# Patient Record
Sex: MALE | Employment: UNEMPLOYED | URBAN - NONMETROPOLITAN AREA
[De-identification: names, ages, dates, MRNs, and addresses within clinical notes are randomized per-mention and may not be internally consistent; named-entity substitution may affect disease eponyms.]

---

## 2022-01-22 ENCOUNTER — APPOINTMENT (EMERGENCY)
Dept: RADIOLOGY | Facility: HOSPITAL | Age: 22
End: 2022-01-22

## 2022-01-22 ENCOUNTER — APPOINTMENT (EMERGENCY)
Dept: CT IMAGING | Facility: HOSPITAL | Age: 22
End: 2022-01-22

## 2022-01-22 ENCOUNTER — HOSPITAL ENCOUNTER (EMERGENCY)
Facility: HOSPITAL | Age: 22
Discharge: HOME | End: 2022-01-22
Attending: EMERGENCY MEDICINE | Admitting: EMERGENCY MEDICINE

## 2022-01-22 VITALS
OXYGEN SATURATION: 90 % | TEMPERATURE: 98.2 F | HEART RATE: 64 BPM | DIASTOLIC BLOOD PRESSURE: 56 MMHG | SYSTOLIC BLOOD PRESSURE: 119 MMHG | WEIGHT: 184.2 LBS | RESPIRATION RATE: 18 BRPM

## 2022-01-22 DIAGNOSIS — J18.9 PNEUMONIA: Primary | ICD-10-CM

## 2022-01-22 DIAGNOSIS — F19.90 DRUG USE: ICD-10-CM

## 2022-01-22 DIAGNOSIS — R11.0 NAUSEA: ICD-10-CM

## 2022-01-22 DIAGNOSIS — R25.1 SHAKING: ICD-10-CM

## 2022-01-22 LAB
ALBUMIN SERPL BCP-MCNC: 3.7 G/DL (ref 3.5–5)
ALP SERPL-CCNC: 72 U/L (ref 46–116)
ALT SERPL W P-5'-P-CCNC: 35 U/L (ref 12–78)
AMPHETAMINES SERPL QL SCN: NEGATIVE
ANION GAP SERPL CALCULATED.3IONS-SCNC: 10 MMOL/L (ref 4–13)
APAP SERPL-MCNC: <2 UG/ML (ref 10–20)
AST SERPL W P-5'-P-CCNC: 17 U/L (ref 5–45)
BACTERIA UR QL AUTO: ABNORMAL /HPF
BARBITURATES UR QL: NEGATIVE
BASOPHILS # BLD AUTO: 0.05 THOUSANDS/ΜL (ref 0–0.1)
BASOPHILS NFR BLD AUTO: 0 % (ref 0–1)
BENZODIAZ UR QL: NEGATIVE
BILIRUB SERPL-MCNC: 0.32 MG/DL (ref 0.2–1)
BILIRUB UR QL STRIP: NEGATIVE
BUN SERPL-MCNC: 13 MG/DL (ref 5–25)
CALCIUM SERPL-MCNC: 8.5 MG/DL (ref 8.3–10.1)
CHLORIDE SERPL-SCNC: 101 MMOL/L (ref 100–108)
CK SERPL-CCNC: 106 U/L (ref 39–308)
CLARITY UR: CLEAR
CO2 SERPL-SCNC: 27 MMOL/L (ref 21–32)
COCAINE UR QL: POSITIVE
COLOR UR: YELLOW
CREAT SERPL-MCNC: 0.9 MG/DL (ref 0.6–1.3)
EOSINOPHIL # BLD AUTO: 0.05 THOUSAND/ΜL (ref 0–0.61)
EOSINOPHIL NFR BLD AUTO: 0 % (ref 0–6)
ERYTHROCYTE [DISTWIDTH] IN BLOOD BY AUTOMATED COUNT: 12.3 % (ref 11.6–15.1)
ETHANOL SERPL-MCNC: <3 MG/DL (ref 0–3)
FLUAV RNA RESP QL NAA+PROBE: NEGATIVE
FLUBV RNA RESP QL NAA+PROBE: NEGATIVE
GFR SERPL CREATININE-BSD FRML MDRD: 120 ML/MIN/1.73SQ M
GLUCOSE SERPL-MCNC: 108 MG/DL (ref 65–140)
GLUCOSE SERPL-MCNC: 109 MG/DL (ref 65–140)
GLUCOSE UR STRIP-MCNC: ABNORMAL MG/DL
HCT VFR BLD AUTO: 41.4 % (ref 36.5–49.3)
HGB BLD-MCNC: 13.5 G/DL (ref 12–17)
HGB UR QL STRIP.AUTO: NEGATIVE
IMM GRANULOCYTES # BLD AUTO: 0.07 THOUSAND/UL (ref 0–0.2)
IMM GRANULOCYTES NFR BLD AUTO: 1 % (ref 0–2)
KETONES UR STRIP-MCNC: ABNORMAL MG/DL
LACTATE SERPL-SCNC: 1.1 MMOL/L (ref 0.5–2)
LEUKOCYTE ESTERASE UR QL STRIP: NEGATIVE
LYMPHOCYTES # BLD AUTO: 1.39 THOUSANDS/ΜL (ref 0.6–4.47)
LYMPHOCYTES NFR BLD AUTO: 12 % (ref 14–44)
MAGNESIUM SERPL-MCNC: 1.9 MG/DL (ref 1.6–2.6)
MCH RBC QN AUTO: 28.2 PG (ref 26.8–34.3)
MCHC RBC AUTO-ENTMCNC: 32.6 G/DL (ref 31.4–37.4)
MCV RBC AUTO: 87 FL (ref 82–98)
METHADONE UR QL: NEGATIVE
MONOCYTES # BLD AUTO: 0.88 THOUSAND/ΜL (ref 0.17–1.22)
MONOCYTES NFR BLD AUTO: 8 % (ref 4–12)
MUCOUS THREADS UR QL AUTO: ABNORMAL
NEUTROPHILS # BLD AUTO: 8.98 THOUSANDS/ΜL (ref 1.85–7.62)
NEUTS SEG NFR BLD AUTO: 79 % (ref 43–75)
NITRITE UR QL STRIP: NEGATIVE
NON-SQ EPI CELLS URNS QL MICRO: ABNORMAL /HPF
NRBC BLD AUTO-RTO: 0 /100 WBCS
OPIATES UR QL SCN: NEGATIVE
OXYCODONE+OXYMORPHONE UR QL SCN: NEGATIVE
PCP UR QL: NEGATIVE
PH UR STRIP.AUTO: 5.5 [PH]
PLATELET # BLD AUTO: 269 THOUSANDS/UL (ref 149–390)
PMV BLD AUTO: 10.2 FL (ref 8.9–12.7)
POTASSIUM SERPL-SCNC: 3.8 MMOL/L (ref 3.5–5.3)
PROT SERPL-MCNC: 7.5 G/DL (ref 6.4–8.2)
PROT UR STRIP-MCNC: ABNORMAL MG/DL
RBC # BLD AUTO: 4.78 MILLION/UL (ref 3.88–5.62)
RBC #/AREA URNS AUTO: ABNORMAL /HPF
RSV RNA RESP QL NAA+PROBE: NEGATIVE
SARS-COV-2 RNA RESP QL NAA+PROBE: NEGATIVE
SODIUM SERPL-SCNC: 138 MMOL/L (ref 136–145)
SP GR UR STRIP.AUTO: >=1.03 (ref 1–1.03)
THC UR QL: POSITIVE
TSH SERPL DL<=0.05 MIU/L-ACNC: 0.22 UIU/ML (ref 0.36–3.74)
UROBILINOGEN UR QL STRIP.AUTO: 0.2 E.U./DL
WBC # BLD AUTO: 11.42 THOUSAND/UL (ref 4.31–10.16)
WBC #/AREA URNS AUTO: ABNORMAL /HPF

## 2022-01-22 PROCEDURE — 83735 ASSAY OF MAGNESIUM: CPT | Performed by: PHYSICIAN ASSISTANT

## 2022-01-22 PROCEDURE — 0241U HB NFCT DS VIR RESP RNA 4 TRGT: CPT | Performed by: PHYSICIAN ASSISTANT

## 2022-01-22 PROCEDURE — 96361 HYDRATE IV INFUSION ADD-ON: CPT

## 2022-01-22 PROCEDURE — 84443 ASSAY THYROID STIM HORMONE: CPT | Performed by: PHYSICIAN ASSISTANT

## 2022-01-22 PROCEDURE — 82948 REAGENT STRIP/BLOOD GLUCOSE: CPT

## 2022-01-22 PROCEDURE — 71045 X-RAY EXAM CHEST 1 VIEW: CPT

## 2022-01-22 PROCEDURE — 81001 URINALYSIS AUTO W/SCOPE: CPT | Performed by: PHYSICIAN ASSISTANT

## 2022-01-22 PROCEDURE — 99285 EMERGENCY DEPT VISIT HI MDM: CPT | Performed by: PHYSICIAN ASSISTANT

## 2022-01-22 PROCEDURE — 96374 THER/PROPH/DIAG INJ IV PUSH: CPT

## 2022-01-22 PROCEDURE — 82077 ASSAY SPEC XCP UR&BREATH IA: CPT | Performed by: PHYSICIAN ASSISTANT

## 2022-01-22 PROCEDURE — 85025 COMPLETE CBC W/AUTO DIFF WBC: CPT | Performed by: PHYSICIAN ASSISTANT

## 2022-01-22 PROCEDURE — 36415 COLL VENOUS BLD VENIPUNCTURE: CPT | Performed by: PHYSICIAN ASSISTANT

## 2022-01-22 PROCEDURE — 80053 COMPREHEN METABOLIC PANEL: CPT | Performed by: PHYSICIAN ASSISTANT

## 2022-01-22 PROCEDURE — 70450 CT HEAD/BRAIN W/O DYE: CPT

## 2022-01-22 PROCEDURE — 93005 ELECTROCARDIOGRAM TRACING: CPT

## 2022-01-22 PROCEDURE — 99285 EMERGENCY DEPT VISIT HI MDM: CPT

## 2022-01-22 PROCEDURE — 83605 ASSAY OF LACTIC ACID: CPT | Performed by: PHYSICIAN ASSISTANT

## 2022-01-22 PROCEDURE — 82550 ASSAY OF CK (CPK): CPT | Performed by: PHYSICIAN ASSISTANT

## 2022-01-22 PROCEDURE — 80307 DRUG TEST PRSMV CHEM ANLYZR: CPT | Performed by: PHYSICIAN ASSISTANT

## 2022-01-22 PROCEDURE — 80143 DRUG ASSAY ACETAMINOPHEN: CPT | Performed by: PHYSICIAN ASSISTANT

## 2022-01-22 PROCEDURE — 96375 TX/PRO/DX INJ NEW DRUG ADDON: CPT

## 2022-01-22 PROCEDURE — G1004 CDSM NDSC: HCPCS

## 2022-01-22 RX ORDER — AZITHROMYCIN 250 MG/1
TABLET, FILM COATED ORAL
Qty: 6 TABLET | Refills: 0 | Status: SHIPPED | OUTPATIENT
Start: 2022-01-22 | End: 2022-01-26

## 2022-01-22 RX ORDER — ONDANSETRON 4 MG/1
4 TABLET, ORALLY DISINTEGRATING ORAL EVERY 6 HOURS PRN
Qty: 20 TABLET | Refills: 0 | Status: SHIPPED | OUTPATIENT
Start: 2022-01-22

## 2022-01-22 RX ORDER — NALOXONE HYDROCHLORIDE 0.4 MG/ML
0.2 INJECTION, SOLUTION INTRAMUSCULAR; INTRAVENOUS; SUBCUTANEOUS ONCE
Status: COMPLETED | OUTPATIENT
Start: 2022-01-22 | End: 2022-01-22

## 2022-01-22 RX ORDER — ONDANSETRON 2 MG/ML
4 INJECTION INTRAMUSCULAR; INTRAVENOUS ONCE
Status: COMPLETED | OUTPATIENT
Start: 2022-01-22 | End: 2022-01-22

## 2022-01-22 RX ADMIN — NALOXONE HYDROCHLORIDE 0.2 MG: 0.4 INJECTION, SOLUTION INTRAMUSCULAR; INTRAVENOUS; SUBCUTANEOUS at 13:17

## 2022-01-22 RX ADMIN — SODIUM CHLORIDE 1000 ML: 0.9 INJECTION, SOLUTION INTRAVENOUS at 12:37

## 2022-01-22 RX ADMIN — ONDANSETRON 4 MG: 2 INJECTION INTRAMUSCULAR; INTRAVENOUS at 12:46

## 2022-01-22 NOTE — ED NOTES
Patient reports taking a pill from a friend that was supposedly a percocet, but is unsure of what it was because it was not prescribed and he took it for recreational use as a trial       Rod Kendrick RN  01/22/22 37[address] [facility]

## 2022-01-22 NOTE — ED PROVIDER NOTES
History  Chief Complaint   Patient presents with    Nausea     patient Aunt stated the ambulance was called to his house 3 times in past 2 days and they gave him narcan once because he was not breathing (according to aunt)  and he woke up  patient nauseated currently  smokes marijuana   Seizure - New Onset     per aunt lasting 2-3 minutes  happened last night and tuesday night  Patient is a 24year old male past medical history of drug abuse, who presents to the ED escorted by his aunt for multiple concerns  Aunt states that he has been staying with her  On 1/18, he was taken to the ED at [facility] via EMS for drug overdose  He admits to smoking marijuana daily, but states the day of his overdose he took "half a pill"  He was told it was a percoet but is not sure  He recovered with narcan in EMS and stayed in the hospital overnight  Once release he was improved but over the last few days he has been "out of it" and having " shaking episodes" where his whole body shakes and he is unresponsive  He awakes immediately and alert and oriented, but doesn't remember the events  No head injury, that her bladder incontinence, tongue biting  This episode has occurred 3 times most recently last night  Today he is very tired and feeling out of It  Did admit to some nausea without vomiting today  The patient states that he has been smoking marijuana daily  He states that the last ER suggested that he may be having marijuana laced with fentanyl     Per patient he denies any SI or HI  States that this overdose was unintentional   States that he does smoke daily marijuana  Has continued to smoke marijuana since the overdose  He denies any IV drug abuse or snoring medications  Denies any alcohol abuse  Denies any current pain  Patient currently denies any SI or HI  Denies any cough shortness of breath, abdominal pain diarrhea back pain headache            History provided by:  Patient and relative  Nausea  The primary symptoms include fatigue and nausea  Primary symptoms do not include fever, weight loss, abdominal pain, vomiting, melena, hematemesis, dysuria, myalgias, arthralgias or rash  The illness began today  The onset was gradual  The problem has not changed since onset  The fatigue began 3 to 5 days ago  The fatigue has been unchanged since its onset  The illness is also significant for chills  The illness does not include dysphagia, bloating, back pain or itching  Associated medical issues do not include alcohol abuse  Overdose - Accidental  Ingested substance:  Illicit drugs  Illicit drug type:  Marijuana  Witnesses present: no    Called poison control: no    Incident location:  Home  Context: recreational    Context: not suicide attempt    Associated symptoms: nausea and unresponsiveness    Associated symptoms: no abdominal pain, no agitation, no altered mental status, no chest pain, no headaches, no shortness of breath and no vomiting    Nausea:     Severity:  Moderate    Onset quality:  Gradual    Timing:  Intermittent    Progression:  Waxing and waning  Risk factors: no hx of sucide attempts and no similar prior episodes        None       History reviewed  No pertinent past medical history  Past Surgical History:   Procedure Laterality Date    WISDOM TOOTH EXTRACTION         History reviewed  No pertinent family history  I have reviewed and agree with the history as documented  E-Cigarette/Vaping    E-Cigarette Use Current Every Day User      E-Cigarette/Vaping Substances    Nicotine Yes      Social History     Tobacco Use    Smoking status: Current Every Day Smoker     Packs/day: 0 50    Smokeless tobacco: Never Used   Vaping Use    Vaping Use: Every day    Substances: Nicotine   Substance Use Topics    Alcohol use: Never    Drug use: Yes     Types: Marijuana       Review of Systems   Constitutional: Positive for chills and fatigue  Negative for fever and weight loss     HENT: Negative for ear pain     Eyes: Negative for pain and visual disturbance  Respiratory: Negative for shortness of breath and stridor  Cardiovascular: Negative for chest pain and palpitations  Gastrointestinal: Positive for nausea  Negative for abdominal pain, bloating, dysphagia, hematemesis, melena and vomiting  Genitourinary: Negative for dysuria and hematuria  Musculoskeletal: Negative for arthralgias, back pain and myalgias  Skin: Negative for color change, itching and rash  Neurological: Positive for tremors and seizures  Negative for speech difficulty and headaches  Psychiatric/Behavioral: Negative for agitation  All other systems reviewed and are negative  Physical Exam  Physical Exam  Vitals and nursing note reviewed  Constitutional:       Appearance: Normal appearance  He is well-developed  Comments: Somnolent but arousable  HENT:      Head: Normocephalic and atraumatic  Nose: Nose normal       Mouth/Throat:      Mouth: Mucous membranes are dry  Eyes:      Extraocular Movements: Extraocular movements intact  Conjunctiva/sclera: Conjunctivae normal       Pupils: Pupils are equal, round, and reactive to light  Cardiovascular:      Rate and Rhythm: Normal rate and regular rhythm  Heart sounds: No murmur heard  Pulmonary:      Effort: Pulmonary effort is normal  No respiratory distress  Breath sounds: Normal breath sounds  Abdominal:      Palpations: Abdomen is soft  Tenderness: There is no abdominal tenderness  Musculoskeletal:      Cervical back: Neck supple  Skin:     General: Skin is warm and dry  Capillary Refill: Capillary refill takes less than 2 seconds  Neurological:      General: No focal deficit present  Mental Status: He is oriented to person, place, and time and easily aroused  GCS: GCS eye subscore is 4  GCS verbal subscore is 5  GCS motor subscore is 6  Gait: Gait is intact     Psychiatric:         Behavior: Behavior is cooperative           Vital Signs  ED Triage Vitals [01/22/22 1208]   Temperature Pulse Respirations Blood Pressure SpO2   98 2 °F (36 8 °C) 79 18 124/78 98 %      Temp Source Heart Rate Source Patient Position - Orthostatic VS BP Location FiO2 (%)   Temporal Monitor -- -- --      Pain Score       No Pain           Vitals:    01/22/22 1330 01/22/22 1415 01/22/22 1430 01/22/22 1500   BP: 124/67 114/59 119/58 119/56   Pulse: 61 60 57 64         Visual Acuity  Visual Acuity      Most Recent Value   L Pupil Size (mm) 4   R Pupil Size (mm) 4          ED Medications  Medications   sodium chloride 0 9 % bolus 1,000 mL (0 mL Intravenous Stopped 1/22/22 1413)   ondansetron (ZOFRAN) injection 4 mg (4 mg Intravenous Given 1/22/22 1246)   naloxone (NARCAN) injection 0 2 mg (0 2 mg Intravenous Given 1/22/22 1317)       Diagnostic Studies  Results Reviewed     Procedure Component Value Units Date/Time    Urine Microscopic [[id number]]  (Abnormal) Collected: 01/22/22 1414    Lab Status: Final result Specimen: Urine, Clean Catch Updated: 01/22/22 1447     RBC, UA None Seen /hpf      WBC, UA None Seen /hpf      Epithelial Cells Occasional /hpf      Bacteria, UA None Seen /hpf      MUCUS THREADS Moderate    UA w Reflex to Microscopic w Reflex to Culture [[id number]]  (Abnormal) Collected: 01/22/22 1414    Lab Status: Final result Specimen: Urine, Clean Catch Updated: 01/22/22 1441     Color, UA Yellow     Clarity, UA Clear     Specific Gravity, UA >=1 030     pH, UA 5 5     Leukocytes, UA Negative     Nitrite, UA Negative     Protein, UA 30 (1+) mg/dl      Glucose,  (1/10%) mg/dl      Ketones, UA Trace mg/dl      Urobilinogen, UA 0 2 E U /dl      Bilirubin, UA Negative     Blood, UA Negative    Rapid drug screen, urine [[id number]]  (Abnormal) Collected: 01/22/22 1414    Lab Status: Final result Specimen: Urine, Clean Catch Updated: 01/22/22 1437     Amph/Meth UR Negative     Barbiturate Ur Negative     Benzodiazepine Urine Negative     Cocaine Urine Positive     Methadone Urine Negative     Opiate Urine Negative     PCP Ur Negative     THC Urine Positive     Oxycodone Urine Negative    Narrative:      Presumptive report  If requested, specimen will be sent to reference lab for confirmation  FOR MEDICAL PURPOSES ONLY  IF CONFIRMATION NEEDED PLEASE CONTACT THE LAB WITHIN 5 DAYS  Drug Screen Cutoff Levels:  AMPHETAMINE/METHAMPHETAMINES  1000 ng/mL  BARBITURATES     200 ng/mL  BENZODIAZEPINES     200 ng/mL  COCAINE      300 ng/mL  METHADONE      300 ng/mL  OPIATES      300 ng/mL  PHENCYCLIDINE     25 ng/mL  THC       50 ng/mL  OXYCODONE      100 ng/mL    COVID/FLU/RSV - 2 hour TAT [[id number]]  (Normal) Collected: 01/22/22 1234    Lab Status: Final result Specimen: Nares from Nose Updated: 01/22/22 1328     SARS-CoV-2 Negative     INFLUENZA A PCR Negative     INFLUENZA B PCR Negative     RSV PCR Negative    Narrative:      FOR PEDIATRIC PATIENTS - copy/paste COVID Guidelines URL to browser: [email or web address]  [email or web address]    SARS-CoV-2 assay is a Nucleic Acid Amplification assay intended for the  qualitative detection of nucleic acid from SARS-CoV-2 in nasopharyngeal  swabs  Results are for the presumptive identification of SARS-CoV-2 RNA  Positive results are indicative of infection with SARS-CoV-2, the virus  causing COVID-19, but do not rule out bacterial infection or co-infection  with other viruses  Laboratories within the [place] and its  territories are required to report all positive results to the appropriate  public health authorities  Negative results do not preclude SARS-CoV-2  infection and should not be used as the sole basis for treatment or other  patient management decisions  Negative results must be combined with  clinical observations, patient history, and epidemiological information  This test has not been FDA cleared or approved      This test has been authorized by FDA under an Emergency Use Authorization  (EUA)  This test is only authorized for the duration of time the  declaration that circumstances exist justifying the authorization of the  emergency use of an in vitro diagnostic tests for detection of SARS-CoV-2  virus and/or diagnosis of COVID-19 infection under section 564(b)(1) of  the Act, 21 U  S C  548OOU-5(F)(8), unless the authorization is terminated  or revoked sooner  The test has been validated but independent review by FDA  and CLIA is pending  Test performed using [employer] GeneXpert: This RT-PCR assay targets N2,  a region unique to SARS-CoV-2  A conserved region in the E-gene was chosen  for pan-Sarbecovirus detection which includes SARS-CoV-2      Comprehensive metabolic panel [[id number]] Collected: 01/22/22 1232    Lab Status: Final result Specimen: Blood from Arm, Left Updated: 01/22/22 1312     Sodium 138 mmol/L      Potassium 3 8 mmol/L      Chloride 101 mmol/L      CO2 27 mmol/L      ANION GAP 10 mmol/L      BUN 13 mg/dL      Creatinine 0 90 mg/dL      Glucose 109 mg/dL      Calcium 8 5 mg/dL      AST 17 U/L      ALT 35 U/L      Alkaline Phosphatase 72 U/L      Total Protein 7 5 g/dL      Albumin 3 7 g/dL      Total Bilirubin 0 32 mg/dL      eGFR 120 ml/min/1 73sq m     Narrative:      [place] guidelines for Chronic Kidney Disease (CKD):     Stage 1 with normal or high GFR (GFR > 90 mL/min/1 73 square meters)    Stage 2 Mild CKD (GFR = 60-89 mL/min/1 73 square meters)    Stage 3A Moderate CKD (GFR = 45-59 mL/min/1 73 square meters)    Stage 3B Moderate CKD (GFR = 30-44 mL/min/1 73 square meters)    Stage 4 Severe CKD (GFR = 15-29 mL/min/1 73 square meters)    Stage 5 End Stage CKD (GFR <15 mL/min/1 73 square meters)  Note: GFR calculation is accurate only with a steady state creatinine    Magnesium [[id number]]  (Normal) Collected: 01/22/22 1233    Lab Status: Final result Specimen: Blood from Arm, Left Updated: 01/22/22 1312     Magnesium 1 9 mg/dL     Acetaminophen level-"If concentration is detectable, please discuss with medical  on call " [[id number]]  (Abnormal) Collected: 01/22/22 1234    Lab Status: Final result Specimen: Blood from Arm, Left Updated: 01/22/22 1312     Acetaminophen Level <2 0 ug/mL     TSH [[id number]]  (Abnormal) Collected: 01/22/22 1234    Lab Status: Final result Specimen: Blood from Arm, Left Updated: 01/22/22 1311     TSH 3RD GENERATON 0 223 uIU/mL     Narrative:      Patients undergoing fluorescein dye angiography may retain small amounts of fluorescein in the body for 48-72 hours post procedure  Samples containing fluorescein can produce falsely depressed TSH values  If the patient had this procedure,a specimen should be resubmitted post fluorescein clearance  Lactic acid [[id number]]  (Normal) Collected: 01/22/22 1234    Lab Status: Final result Specimen: Blood from Arm, Left Updated: 01/22/22 1307     LACTIC ACID 1 1 mmol/L     Narrative:      Result may be elevated if tourniquet was used during collection      CK Total with Reflex CKMB [[id number]]  (Normal) Collected: 01/22/22 1234    Lab Status: Final result Specimen: Blood from Arm, Left Updated: 01/22/22 1304     Total  U/L     Ethanol [[id number]]  (Normal) Collected: 01/22/22 1234    Lab Status: Final result Specimen: Blood from Arm, Left Updated: 01/22/22 1259     Ethanol Lvl <3 mg/dL     CBC and differential [[id number]]  (Abnormal) Collected: 01/22/22 1234    Lab Status: Final result Specimen: Blood from Arm, Left Updated: 01/22/22 1245     WBC 11 42 Thousand/uL      RBC 4 78 Million/uL      Hemoglobin 13 5 g/dL      Hematocrit 41 4 %      MCV 87 fL      MCH 28 2 pg      MCHC 32 6 g/dL      RDW 12 3 %      MPV 10 2 fL      Platelets 550 Thousands/uL      nRBC 0 /100 WBCs      Neutrophils Relative 79 %      Immat GRANS % 1 %      Lymphocytes Relative 12 %      Monocytes Relative 8 %      Eosinophils Relative 0 %      Basophils Relative 0 %      Neutrophils Absolute 8 98 Thousands/µL      Immature Grans Absolute 0 07 Thousand/uL      Lymphocytes Absolute 1 39 Thousands/µL      Monocytes Absolute 0 88 Thousand/µL      Eosinophils Absolute 0 05 Thousand/µL      Basophils Absolute 0 05 Thousands/µL     Fingerstick Glucose (POCT) [[id number]]  (Normal) Collected: 01/22/22 1232    Lab Status: Final result Updated: 01/22/22 1240     POC Glucose 108 mg/dl                  CT head without contrast   Final Result by Willy Whitt DO (01/22 1322)      No acute intracranial abnormality  If clinically warranted, nonemergent follow-up MRI may be considered to assess potential seizure etiology  Evidence of chronic maxillary sinusitis  Workstation performed: [id number]         XR chest 1 view portable   Final Result by Lizbeth Garg MD (01/22 4808)      Subtle right basilar infiltrate  This could represent early pneumonia  The study was marked in [facility] for immediate notification  Workstation performed: [id number]                    Procedures  ECG 12 Lead Documentation Only    Date/Time: 1/22/2022 1:39 PM  Performed by: Gian Marsh PA-C  Authorized by: Gian Marsh PA-C     Indications / Diagnosis:  Nausea, drug use   ECG reviewed by me, the ED Provider: yes    Patient location:  ED  Previous ECG:     Previous ECG:  Unavailable  Interpretation:     Interpretation: non-specific    Rate:     ECG rate:  78    ECG rate assessment: normal    Rhythm:     Rhythm: sinus rhythm               ED Course  ED Course as of 01/25/22 1957   Sat Jan 22, 2022   1454 COCAINE URINE(!): Positive                               SBIRT 20yo+      Most Recent Value   SBIRT (23 yo +)    In order to provide better care to our patients, we are screening all of our patients for alcohol and drug use  Would it be okay to ask you these screening questions?  No Filed at: 01/22/2022 26                    MDM  Number of Diagnoses or Management Options  Drug use  Nausea  Pneumonia  Shaking  Diagnosis management comments: Patient's lab work demonstrated mild leukocytosis  He noted on chest x-ray to have possible early pneumonia  CT head was unremarkable  Patient's tox screen revealed positive for cocaine and THC  Possibility of fentanyl  ingestion due to lack of testing, unable to confirm  Patient's mental status did improve with the Narcan that was given in the ER  Patient also was rehydrated and given Zofran  Patient was offered warm handoff  Patient declined this at this time  Ambulated and remained 91-94% on room air  But was treated for his pneumonia with a Z-Mark  Noted that these "shaking spells" that was described by the aunt, most suspicious for the drug use  The patient had 3 episodes at the family member described as full body shaking and unresponsiveness  Patient then awoke alert and oriented which is not consistent with any postictal symptoms  Patient also admitted that he has been smoking THC which was previously suspected and another facility earlier in the week to have been laced with fentanyl due to his overdose  Patient was referred to PCP, and also offered warm handoff, given outpatient number for follow up  Patient was previously given rx for narcan earlier in the week for any overdose symptoms at home   Discharge home into care of his aunt        Amount and/or Complexity of Data Reviewed  Clinical lab tests: ordered and reviewed  Tests in the radiology section of CPT®: ordered and reviewed  Decide to obtain previous medical records or to obtain history from someone other than the patient: yes  Obtain history from someone other than the patient: yes  Review and summarize past medical records: yes  Discuss the patient with other providers: yes  Independent visualization of images, tracings, or specimens: yes    Risk of Complications, Morbidity, and/or Mortality  Presenting problems: moderate  Diagnostic procedures: moderate  Management options: moderate    Patient Progress  Patient progress: stable      Disposition  Final diagnoses:   Nausea   Shaking   Drug use   Pneumonia     Time reflects when diagnosis was documented in both MDM as applicable and the Disposition within this note     Time User Action Codes Description Comment    1/22/2022  3:08 PM Verner Drafts Add [R11 0] Nausea     1/22/2022  3:08 PM Verner Drafts Add [R25 1] Shaking     1/22/2022  3:09 PM Verner Drafts Add [F19 90] Drug use     1/22/2022  3:09 PM Verner Drafts Add [J18 9] Pneumonia     1/22/2022  3:09 PM Verner Drafts Modify [R11 0] Nausea     1/22/2022  3:09 PM Verner Drafts Modify [R25 1] Shaking     1/22/2022  3:09 PM Verner Drafts Modify [F19 90] Drug use     1/22/2022  3:09 PM Verner Drafts Modify [J18 9] Pneumonia       ED Disposition     ED Disposition Condition Date/Time Comment    Discharge Stable Sat Jan 22, 2022  3:09 PM Red Mas discharge to home/self care              Follow-up Information     Follow up With Specialties Details Why Contact Info Additional Information    Renny Harry MD Pediatric Nephrology Schedule an appointment as soon as possible for a visit   [address] [place] [address] Primary Care Family Medicine Schedule an appointment as soon as possible for a visit   [place] [phone]  [address] [place] [address], [facility]          Discharge Medication List as of 1/22/2022  3:13 PM      START taking these medications    Details   azithromycin (ZITHROMAX) 250 mg tablet Take 2 tablets today then 1 tablet daily x 4 days, Normal      ondansetron (Zofran ODT) 4 mg disintegrating tablet Take 1 tablet (4 mg total) by mouth every 6 (six) hours as needed for nausea or vomiting, Starting Sat 1/22/2022, Normal             No discharge procedures on file      PDMP Review     None          ED Provider  Electronically Signed by           Francisco Parker PA-C  01/22/22 2557 Marly Combs PA-C  01/25/22 6294

## 2022-01-22 NOTE — DISCHARGE INSTRUCTIONS
Please follow-up with the primary care provider  He has noticed some concern for sleep apnea  If wanting to seek help for drug addiction please follow-up with "warm hand off "- [phone]    We believe that your marijuana may be laced with fentanyl  This is a highly toxic drugs  Please avoid use of this

## 2022-01-24 LAB
ATRIAL RATE: 78 BPM
P AXIS: 67 DEGREES
PR INTERVAL: 156 MS
QRS AXIS: 60 DEGREES
QRSD INTERVAL: 96 MS
QT INTERVAL: 398 MS
QTC INTERVAL: 453 MS
T WAVE AXIS: 55 DEGREES
VENTRICULAR RATE: 78 BPM